# Patient Record
Sex: FEMALE | Race: WHITE
[De-identification: names, ages, dates, MRNs, and addresses within clinical notes are randomized per-mention and may not be internally consistent; named-entity substitution may affect disease eponyms.]

---

## 2021-02-18 ENCOUNTER — HOSPITAL ENCOUNTER (INPATIENT)
Dept: HOSPITAL 11 - JP.SDS | Age: 26
LOS: 2 days | Discharge: HOME | End: 2021-02-20
Attending: SURGERY | Admitting: SURGERY
Payer: MEDICAID

## 2021-02-18 DIAGNOSIS — Z3A.39: ICD-10-CM

## 2021-02-18 DIAGNOSIS — Z86.16: ICD-10-CM

## 2021-02-18 DIAGNOSIS — O34.211: Primary | ICD-10-CM

## 2021-02-18 DIAGNOSIS — Z87.891: ICD-10-CM

## 2021-02-19 NOTE — PN
DATE OF SERVICE:  2021

 

SUBJECTIVE:  Jannie Rodriguez is postop day 1 following a  section.  Vital signs

stable.  Oral intake on a regular diet was 1475.  1500 mL urinary output.  Pain is

controlled with PCA.

 

REVIEW OF SYSTEMS:  Remainder of review of systems negative for any pertinent positives and

negatives.

 

OBJECTIVE:  GENERAL:  Jannie Rodriguez is a pleasant 25-year-old female, alert, oriented.

VITAL SIGNS:  TPR last checked at 0134, 95.6, 79, 18, blood pressure 119/58, O2 sats by

pulse oximetry 94%.

HEENT:  Negative.

NECK:  Supple.

HEART:  Regular rate and rhythm.

LUNGS:  Clear.

ABDOMEN:  Abdominal binder is on.

EXTREMITIES:  Without peripheral edema.

 

ASSESSMENT:   section delivery of viable male infant, date 2021.

 

PLAN:

1. Saline lock IV.

2. Discontinue PCA and continuous pulse ox.

3. Dilaudid 2 to 4 mg every 4 hours p.r.n. pain.

4. Colace 100 mg p.o. b.i.d.

5. Dulcolax 10 mg p.o. b.i.d.  May stop after the patient has bowel movement.

6. May shower.

7. We will evaluate p.r.n. or in a.m.

 

 

 

 

Emily Kilpatrick PA-C

DD:  2021 07:11:22

DT:  2021 07:51:57

Job #:  [id number]

## 2021-02-21 NOTE — DISCH
FINAL DIAGNOSES:

1. Term pregnancy with history of previous  section.

2. History of posttraumatic stress disorder.

3. Recent COVID-19 infection.

 

OPERATIVE PROCEDURE:  Repeat  section done on 2021.

 

SUMMARY:  This is a 25-year-old female presenting for a scheduled repeat  section.

She had a recent COVID infection for which she is now cleared and underwent a 

section without difficulty on the date of admission.  The lateral malleolus delivered

through a vertex presentation with occiput posterior presentation.  Clinically, both baby

and mother have done well postoperatively.  She will be discharged home finishing all

prenatal vitamins with 2 doses of milk of magnesia to take p.r.n., also Motrin 600 mg p.o.

q.i.d. p.r.n. #40, and Dilaudid 2 mg p.o. q.4 hours p.r.n. pain #40.  Follow up will be with

Emily Kilpatrick at [facility] on Tuesday, 2021 at 10 a.m.

 

DD:  2021 07:36:40

DT:  2021 13:56:25

Job #:  [id number]

## 2021-02-21 NOTE — OR
DATE OF PROCEDURE:  2021

 

SURGEON:  Deangelo Benavides MD

 

PREOPERATIVE DIAGNOSIS:  Term pregnancy with history of previous  section.

 

POSTOPERATIVE DIAGNOSIS:  Term pregnancy with history of previous  section.

 

OPERATIVE PROCEDURE:  Repeat  section (99130).

 

ANESTHESIA:  Spinal.

 

ASSISTANT:  Sarah Boland CNM and MICA Robledo student.

 

INDICATIONS FOR PROCEDURE:  This is a 25-year-old presenting with term pregnancy with

history of previous  section.  She was recently diagnosed with colon infection that

is now cleared and she has been approved for surgical intervention at this time.  The plan

is to proceed with repeat  section.  Potential risks including bleeding, infection,

injury to the baby and mother were reviewed and the patient wishes to proceed.

 

DETAILS OF PROCEDURE:  The patient was taken to the operating room and placed in a supine

position.  After spinal anesthetic was placed and a roll positioned underneath the right

hip, a Schultz catheter was inserted and the abdomen prepped and draped.  The previous

Pfannenstiel incision was then reused and carried down through the skin and subcutaneous

tissue, and through the anterior rectus sheath, subrectus sheath flaps were then raised

superiorly and inferiorly and the midline peritoneum divided.  Peritoneal reflection of the

bladder on the uterus was reflected downward.  A transverse lower uterine segment incision

was made.  Viable male was delivered with vertex presentation.  The baby's head was quite

big and had an occiput posterior position which would indicate that even if this was a

primary pregnancy a  section likely would be needed.  At any rate, the male was

delivered without difficulty.  Ports were clamped and cut.  Chicken care given off the field

per Sarah Boland CNM.  Placental and fetal membranes were then delivered without

difficulty.  The patient was given intrauterine oxytocin and IV cefoxitin.  Good uterine

contractions were noted.  The uterus was then closed with 2 layers of 2-0 Vicryl stitch as

was the peritoneal reflection of the bladder on the uterus, and at that point, the posterior

peritoneum and musculature were approximated with a #2 Vicryl stitch as was the anterior

rectus sheath.  The subcutaneous tissue was irrigated with cefoxitin-containing saline

solution and then closed with 2 layers of 3-0 and 4-0 Vicryl stitch deep and a surgical glue

applied to the incision.  The patient was taken to the recovery room in satisfactory

condition.  There were no evident complications.

 

Per ACOG guidelines, nurse midwife, Sarah Boland's assistance in this case was indicated.

 

 

 

 

Deangelo Benavides MD

DD:  2021 21:59:02

DT:  2021 12:10:24

Job #:  [id number]

## 2022-08-03 ENCOUNTER — HOSPITAL ENCOUNTER (EMERGENCY)
Dept: HOSPITAL 11 - JP.ED | Age: 27
Discharge: HOME | End: 2022-08-03
Payer: MEDICAID

## 2022-08-03 DIAGNOSIS — F17.210: ICD-10-CM

## 2022-08-03 DIAGNOSIS — K21.9: ICD-10-CM

## 2022-08-03 DIAGNOSIS — E86.0: Primary | ICD-10-CM

## 2022-08-03 DIAGNOSIS — Z20.822: ICD-10-CM

## 2022-08-03 DIAGNOSIS — Z86.16: ICD-10-CM

## 2022-08-03 PROCEDURE — 85025 COMPLETE CBC W/AUTO DIFF WBC: CPT

## 2022-08-03 PROCEDURE — 96360 HYDRATION IV INFUSION INIT: CPT

## 2022-08-03 PROCEDURE — 36415 COLL VENOUS BLD VENIPUNCTURE: CPT

## 2022-08-03 PROCEDURE — 80053 COMPREHEN METABOLIC PANEL: CPT

## 2022-08-03 PROCEDURE — 99284 EMERGENCY DEPT VISIT MOD MDM: CPT

## 2022-08-03 PROCEDURE — 86140 C-REACTIVE PROTEIN: CPT

## 2022-08-03 PROCEDURE — 81001 URINALYSIS AUTO W/SCOPE: CPT

## 2022-08-03 PROCEDURE — U0002 COVID-19 LAB TEST NON-CDC: HCPCS

## 2022-08-03 PROCEDURE — 96374 THER/PROPH/DIAG INJ IV PUSH: CPT

## 2022-08-03 PROCEDURE — 96375 TX/PRO/DX INJ NEW DRUG ADDON: CPT

## 2022-08-03 PROCEDURE — 96361 HYDRATE IV INFUSION ADD-ON: CPT

## 2022-08-03 PROCEDURE — 87635 SARS-COV-2 COVID-19 AMP PRB: CPT

## 2022-08-03 RX ADMIN — KETOROLAC TROMETHAMINE ONE MG: 30 INJECTION, SOLUTION INTRAMUSCULAR at 21:00

## 2022-08-03 RX ADMIN — ONDANSETRON ONE MG: 2 INJECTION, SOLUTION INTRAMUSCULAR; INTRAVENOUS at 21:00

## 2022-12-29 ENCOUNTER — HOSPITAL ENCOUNTER (EMERGENCY)
Dept: HOSPITAL 11 - JP.ED | Age: 27
Discharge: HOME | End: 2022-12-29
Payer: MEDICAID

## 2022-12-29 DIAGNOSIS — K21.9: ICD-10-CM

## 2022-12-29 DIAGNOSIS — Z86.16: ICD-10-CM

## 2022-12-29 DIAGNOSIS — R51.9: Primary | ICD-10-CM

## 2022-12-29 PROCEDURE — 96374 THER/PROPH/DIAG INJ IV PUSH: CPT

## 2022-12-29 PROCEDURE — 85379 FIBRIN DEGRADATION QUANT: CPT

## 2022-12-29 PROCEDURE — 36415 COLL VENOUS BLD VENIPUNCTURE: CPT

## 2022-12-29 PROCEDURE — 70450 CT HEAD/BRAIN W/O DYE: CPT

## 2022-12-29 PROCEDURE — 99284 EMERGENCY DEPT VISIT MOD MDM: CPT
